# Patient Record
Sex: FEMALE | URBAN - METROPOLITAN AREA
[De-identification: names, ages, dates, MRNs, and addresses within clinical notes are randomized per-mention and may not be internally consistent; named-entity substitution may affect disease eponyms.]

---
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
## 2019-01-07 ENCOUNTER — APPOINTMENT (OUTPATIENT)
Dept: ORTHOPEDIC SURGERY | Facility: CLINIC | Age: 19
End: 2019-01-07
Payer: COMMERCIAL

## 2019-01-07 VITALS — WEIGHT: 109 LBS | HEIGHT: 63 IN | BODY MASS INDEX: 19.31 KG/M2

## 2019-01-07 PROCEDURE — 99024 POSTOP FOLLOW-UP VISIT: CPT

## 2019-01-07 RX ORDER — ONDANSETRON 4 MG/1
4 TABLET ORAL EVERY 8 HOURS
Qty: 4 | Refills: 0 | Status: DISCONTINUED | COMMUNITY
Start: 2018-11-15 | End: 2019-01-07

## 2019-01-10 NOTE — HISTORY OF PRESENT ILLNESS
[de-identified] : DOS: 11-16-18\par 17 year old female accompanied by her boyfriend presenting 7 weeks and 3 days s/p right knee diagnostic arthroscopy and a partial medial menisectomy. She reports some pain with walking, twisting and has stiffness. She states she has a 50% improvement and has been compliant with PT. She reports new injury this weekend when her cousin sat on her knee and it reswelled. No new mechanical symptoms. [de-identified] : General: Patient is awake and alert, demonstrates appropriate mood and affect, exhibits normal breathing and is in no acute distress.\par Constitutional: Well appearing in no apparent distress\par Skin: The skin is intact, warm, pink, and dry over the area examined.\par Lymph: There is no lymphedema except as noted below. \par Cardiovascular: There is brisk capillary refill in the digits of the affected extremity. They are symmetric pulses in the bilateral upper and lower extremities. \par Respiratory: The patient is in no apparent respiratory distress. They're taking full deep breaths without use of accessory muscles or evidence of audible wheezes or stridor without the use of a stethoscope.\par \par Right knee:\par Incisions well healed\par mild effusion\par ROM 0-125\par Stable v/v stress\par no medial or lateral JLT\par stable to lachman\par Motor: EHL/FHL/TA/Gs intact\par Sens: S/S/T/SPN/DPN intact to light touch\par BCR\par  [de-identified] : Assessment: 18-year-old female status post right knee arthroscopy overall doing well she had a reinjury this past Saturday with some swelling.\par \par Patient will continue PT\par I will see the patient back in 6 weeks for reevaluation

## 2019-02-19 ENCOUNTER — APPOINTMENT (OUTPATIENT)
Dept: ORTHOPEDIC SURGERY | Facility: CLINIC | Age: 19
End: 2019-02-19
Payer: COMMERCIAL

## 2019-02-19 DIAGNOSIS — S83.241D OTHER TEAR OF MEDIAL MENISCUS, CURRENT INJURY, RIGHT KNEE, SUBSEQUENT ENCOUNTER: ICD-10-CM

## 2019-02-19 PROCEDURE — 99213 OFFICE O/P EST LOW 20 MIN: CPT

## 2019-08-13 ENCOUNTER — EMERGENCY (EMERGENCY)
Facility: HOSPITAL | Age: 19
LOS: 1 days | Discharge: HOME | End: 2019-08-13
Admitting: EMERGENCY MEDICINE
Payer: COMMERCIAL

## 2019-08-13 VITALS
SYSTOLIC BLOOD PRESSURE: 97 MMHG | DIASTOLIC BLOOD PRESSURE: 62 MMHG | OXYGEN SATURATION: 98 % | HEART RATE: 86 BPM | TEMPERATURE: 98 F | WEIGHT: 115.08 LBS | RESPIRATION RATE: 16 BRPM

## 2019-08-13 PROCEDURE — 99283 EMERGENCY DEPT VISIT LOW MDM: CPT

## 2019-08-13 RX ADMIN — Medication 500 MILLIGRAM(S): at 16:57

## 2019-08-13 NOTE — ED PROVIDER NOTE - CARE PROVIDER_API CALL
Magdy Skinner)  Orthopaedic Surgery  [address] [place] [id number]  Phone: [phone]  Fax: [phone]  Follow Up Time: 1-3 Days

## 2019-08-13 NOTE — ED PROVIDER NOTE - NSFOLLOWUPINSTRUCTIONS_ED_ALL_ED_FT
rest, hydrate well    medication as prescribed    follow up with orthopedics as needed    cold/heat therapy, 4 times a day, 15 min intervals    Strain    A strain is a stretch or tear in one of the muscles in your body. This is caused by an injury to the area such as a twisting mechanism. Symptoms include pain, swelling, or bruising. Rest that area over the next several days and slowly resume activity when tolerated. Ice can help with swelling and pain.     SEEK IMMEDIATE MEDICAL CARE IF YOU HAVE ANY OF THE FOLLOWING SYMPTOMS: worsening pain, inability to move that body part, numbness or tingling.

## 2019-08-13 NOTE — ED PROVIDER NOTE - MUSCULOSKELETAL, MLM
Tenderness to the left posterior medial gluteal wrapping to the thigh. Good pulses. Tenderness to the left posterior lateral gluteal muscle wrapping to the thigh. Good pulses.

## 2019-08-13 NOTE — ED PROVIDER NOTE - OBJECTIVE STATEMENT
19 y/o Female presents to the ED c/o left thigh pain. Pt states she was folk dancing yesterday and heard a "pop" in her groin area. Since then she has had swelling and pain to the left thigh. She has been able to walk but with difficulty secondary to pain.

## 2019-08-14 ENCOUNTER — FORM ENCOUNTER (OUTPATIENT)
Age: 19
End: 2019-08-14

## 2019-08-15 ENCOUNTER — OUTPATIENT (OUTPATIENT)
Dept: OUTPATIENT SERVICES | Facility: HOSPITAL | Age: 19
LOS: 1 days | End: 2019-08-15
Payer: COMMERCIAL

## 2019-08-15 ENCOUNTER — APPOINTMENT (OUTPATIENT)
Dept: ORTHOPEDIC SURGERY | Facility: CLINIC | Age: 19
End: 2019-08-15
Payer: COMMERCIAL

## 2019-08-15 ENCOUNTER — APPOINTMENT (OUTPATIENT)
Dept: RADIOLOGY | Facility: CLINIC | Age: 19
End: 2019-08-15

## 2019-08-15 VITALS — WEIGHT: 109 LBS | BODY MASS INDEX: 19.31 KG/M2 | HEIGHT: 63 IN

## 2019-08-15 DIAGNOSIS — S76.312A STRAIN OF MUSCLE, FASCIA AND TENDON OF THE POSTERIOR MUSCLE GROUP AT THIGH LEVEL, LEFT THIGH, INITIAL ENCOUNTER: ICD-10-CM

## 2019-08-15 PROCEDURE — 99214 OFFICE O/P EST MOD 30 MIN: CPT

## 2019-08-15 PROCEDURE — 72190 X-RAY EXAM OF PELVIS: CPT

## 2019-08-15 PROCEDURE — 72190 X-RAY EXAM OF PELVIS: CPT | Mod: 26

## 2019-08-24 DIAGNOSIS — M79.652 PAIN IN LEFT THIGH: ICD-10-CM
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
## 2019-08-24 DIAGNOSIS — S76.912A STRAIN OF UNSPECIFIED MUSCLES, FASCIA AND TENDONS AT THIGH LEVEL, LEFT THIGH, INITIAL ENCOUNTER: ICD-10-CM

## 2020-01-01 ENCOUNTER — EMERGENCY (EMERGENCY)
Facility: HOSPITAL | Age: 20
LOS: 1 days | Discharge: HOME | End: 2020-01-01
Attending: EMERGENCY MEDICINE | Admitting: EMERGENCY MEDICINE
Payer: COMMERCIAL

## 2020-01-01 ENCOUNTER — RESULT REVIEW (OUTPATIENT)
Age: 20
End: 2020-01-01

## 2020-01-01 ENCOUNTER — INPATIENT (INPATIENT)
Facility: HOSPITAL | Age: 20
LOS: 0 days | Discharge: HOME | End: 2020-01-01
Attending: OBSTETRICS & GYNECOLOGY | Admitting: OBSTETRICS & GYNECOLOGY
Payer: MEDICAID

## 2020-01-01 ENCOUNTER — TRANSCRIPTION ENCOUNTER (OUTPATIENT)
Age: 20
End: 2020-01-01

## 2020-01-01 VITALS
TEMPERATURE: 97 F | RESPIRATION RATE: 18 BRPM | HEART RATE: 95 BPM | SYSTOLIC BLOOD PRESSURE: 102 MMHG | DIASTOLIC BLOOD PRESSURE: 48 MMHG | OXYGEN SATURATION: 100 %

## 2020-01-01 VITALS
OXYGEN SATURATION: 98 % | DIASTOLIC BLOOD PRESSURE: 65 MMHG | HEART RATE: 97 BPM | TEMPERATURE: 98 F | HEIGHT: 63 IN | SYSTOLIC BLOOD PRESSURE: 118 MMHG | WEIGHT: 115.08 LBS | RESPIRATION RATE: 18 BRPM

## 2020-01-01 VITALS
OXYGEN SATURATION: 93 % | TEMPERATURE: 98 F | HEART RATE: 72 BPM | RESPIRATION RATE: 18 BRPM | SYSTOLIC BLOOD PRESSURE: 116 MMHG | DIASTOLIC BLOOD PRESSURE: 77 MMHG

## 2020-01-01 VITALS
DIASTOLIC BLOOD PRESSURE: 44 MMHG | HEART RATE: 62 BPM | OXYGEN SATURATION: 99 % | TEMPERATURE: 98 F | RESPIRATION RATE: 18 BRPM | SYSTOLIC BLOOD PRESSURE: 85 MMHG

## 2020-01-01 LAB
ALBUMIN SERPL ELPH-MCNC: 2.9 G/DL — LOW (ref 3.4–5)
ALP SERPL-CCNC: 83 U/L — SIGNIFICANT CHANGE UP (ref 40–120)
ALT FLD-CCNC: 24 U/L — SIGNIFICANT CHANGE UP (ref 12–42)
ANION GAP SERPL CALC-SCNC: 15 MMOL/L — SIGNIFICANT CHANGE UP (ref 9–16)
APTT BLD: 25.3 SEC — LOW (ref 27.5–36.3)
AST SERPL-CCNC: 27 U/L — SIGNIFICANT CHANGE UP (ref 15–37)
BASOPHILS # BLD AUTO: 0.05 K/UL — SIGNIFICANT CHANGE UP (ref 0–0.2)
BASOPHILS NFR BLD AUTO: 0.3 % — SIGNIFICANT CHANGE UP (ref 0–2)
BILIRUB SERPL-MCNC: 0.7 MG/DL — SIGNIFICANT CHANGE UP (ref 0.2–1.2)
BUN SERPL-MCNC: 4 MG/DL — LOW (ref 7–23)
CALCIUM SERPL-MCNC: 9.2 MG/DL — SIGNIFICANT CHANGE UP (ref 8.5–10.5)
CHLORIDE SERPL-SCNC: 101 MMOL/L — SIGNIFICANT CHANGE UP (ref 96–108)
CO2 SERPL-SCNC: 20 MMOL/L — LOW (ref 22–31)
CREAT SERPL-MCNC: 0.64 MG/DL — SIGNIFICANT CHANGE UP (ref 0.5–1.3)
EOSINOPHIL # BLD AUTO: 0.01 K/UL — SIGNIFICANT CHANGE UP (ref 0–0.5)
EOSINOPHIL NFR BLD AUTO: 0.1 % — SIGNIFICANT CHANGE UP (ref 0–6)
GLUCOSE SERPL-MCNC: 143 MG/DL — HIGH (ref 70–99)
HCG SERPL-ACNC: HIGH MIU/ML
HCT VFR BLD CALC: 31.2 % — LOW (ref 34.5–45)
HGB BLD-MCNC: 10.3 G/DL — LOW (ref 11.5–15.5)
IMM GRANULOCYTES NFR BLD AUTO: 0.8 % — SIGNIFICANT CHANGE UP (ref 0–1.5)
INR BLD: 1.09 — SIGNIFICANT CHANGE UP (ref 0.88–1.16)
LACTATE SERPL-SCNC: 2.2 MMOL/L — HIGH (ref 0.4–2)
LYMPHOCYTES # BLD AUTO: 1.76 K/UL — SIGNIFICANT CHANGE UP (ref 1–3.3)
LYMPHOCYTES # BLD AUTO: 9 % — LOW (ref 13–44)
MAGNESIUM SERPL-MCNC: 1.6 MG/DL — SIGNIFICANT CHANGE UP (ref 1.6–2.6)
MCHC RBC-ENTMCNC: 32.5 PG — SIGNIFICANT CHANGE UP (ref 27–34)
MCHC RBC-ENTMCNC: 33 GM/DL — SIGNIFICANT CHANGE UP (ref 32–36)
MCV RBC AUTO: 98.4 FL — SIGNIFICANT CHANGE UP (ref 80–100)
MONOCYTES # BLD AUTO: 1.38 K/UL — HIGH (ref 0–0.9)
MONOCYTES NFR BLD AUTO: 7 % — SIGNIFICANT CHANGE UP (ref 2–14)
NEUTROPHILS # BLD AUTO: 16.24 K/UL — HIGH (ref 1.8–7.4)
NEUTROPHILS NFR BLD AUTO: 82.8 % — HIGH (ref 43–77)
NRBC # BLD: 0 /100 WBCS — SIGNIFICANT CHANGE UP (ref 0–0)
PLATELET # BLD AUTO: 234 K/UL — SIGNIFICANT CHANGE UP (ref 150–400)
POTASSIUM SERPL-MCNC: 3 MMOL/L — LOW (ref 3.5–5.3)
POTASSIUM SERPL-SCNC: 3 MMOL/L — LOW (ref 3.5–5.3)
PROT SERPL-MCNC: 7.1 G/DL — SIGNIFICANT CHANGE UP (ref 6.4–8.2)
PROTHROM AB SERPL-ACNC: 12 SEC — SIGNIFICANT CHANGE UP (ref 10–12.9)
RBC # BLD: 3.17 M/UL — LOW (ref 3.8–5.2)
RBC # FLD: 12.2 % — SIGNIFICANT CHANGE UP (ref 10.3–14.5)
SODIUM SERPL-SCNC: 136 MMOL/L — SIGNIFICANT CHANGE UP (ref 132–145)
WBC # BLD: 19.59 K/UL — HIGH (ref 3.8–10.5)
WBC # FLD AUTO: 19.59 K/UL — HIGH (ref 3.8–10.5)

## 2020-01-01 PROCEDURE — 99285 EMERGENCY DEPT VISIT HI MDM: CPT

## 2020-01-01 PROCEDURE — 88305 TISSUE EXAM BY PATHOLOGIST: CPT | Mod: 26

## 2020-01-01 PROCEDURE — 71045 X-RAY EXAM CHEST 1 VIEW: CPT | Mod: 26

## 2020-01-01 PROCEDURE — 99053 MED SERV 10PM-8AM 24 HR FAC: CPT

## 2020-01-01 PROCEDURE — 59414 DELIVER PLACENTA: CPT

## 2020-01-01 RX ORDER — ONDANSETRON 8 MG/1
4 TABLET, FILM COATED ORAL ONCE
Refills: 0 | Status: COMPLETED | OUTPATIENT
Start: 2020-01-01 | End: 2020-01-01

## 2020-01-01 RX ORDER — PRAMOXINE HYDROCHLORIDE 150 MG/15G
1 AEROSOL, FOAM RECTAL EVERY 4 HOURS
Refills: 0 | Status: DISCONTINUED | OUTPATIENT
Start: 2020-01-01 | End: 2020-01-01

## 2020-01-01 RX ORDER — SIMETHICONE 80 MG/1
80 TABLET, CHEWABLE ORAL EVERY 4 HOURS
Refills: 0 | Status: DISCONTINUED | OUTPATIENT
Start: 2020-01-01 | End: 2020-01-01

## 2020-01-01 RX ORDER — SODIUM CHLORIDE 9 MG/ML
1000 INJECTION INTRAMUSCULAR; INTRAVENOUS; SUBCUTANEOUS ONCE
Refills: 0 | Status: COMPLETED | OUTPATIENT
Start: 2020-01-01 | End: 2020-01-01

## 2020-01-01 RX ORDER — INFLUENZA VIRUS VACCINE 15; 15; 15; 15 UG/.5ML; UG/.5ML; UG/.5ML; UG/.5ML
0.5 SUSPENSION INTRAMUSCULAR ONCE
Refills: 0 | Status: COMPLETED | OUTPATIENT
Start: 2020-01-01 | End: 2020-01-01

## 2020-01-01 RX ORDER — IBUPROFEN 200 MG
600 TABLET ORAL EVERY 6 HOURS
Refills: 0 | Status: DISCONTINUED | OUTPATIENT
Start: 2020-01-01 | End: 2020-01-01

## 2020-01-01 RX ORDER — HYDROCORTISONE 1 %
1 OINTMENT (GRAM) TOPICAL EVERY 6 HOURS
Refills: 0 | Status: DISCONTINUED | OUTPATIENT
Start: 2020-01-01 | End: 2020-01-01

## 2020-01-01 RX ORDER — PIPERACILLIN AND TAZOBACTAM 4; .5 G/20ML; G/20ML
3.38 INJECTION, POWDER, LYOPHILIZED, FOR SOLUTION INTRAVENOUS ONCE
Refills: 0 | Status: COMPLETED | OUTPATIENT
Start: 2020-01-01 | End: 2020-01-01

## 2020-01-01 RX ORDER — KETOROLAC TROMETHAMINE 30 MG/ML
30 SYRINGE (ML) INJECTION ONCE
Refills: 0 | Status: DISCONTINUED | OUTPATIENT
Start: 2020-01-01 | End: 2020-01-01

## 2020-01-01 RX ORDER — OXYCODONE HYDROCHLORIDE 5 MG/1
5 TABLET ORAL
Refills: 0 | Status: DISCONTINUED | OUTPATIENT
Start: 2020-01-01 | End: 2020-01-01

## 2020-01-01 RX ORDER — DIBUCAINE 1 %
1 OINTMENT (GRAM) RECTAL EVERY 6 HOURS
Refills: 0 | Status: DISCONTINUED | OUTPATIENT
Start: 2020-01-01 | End: 2020-01-01

## 2020-01-01 RX ORDER — LANOLIN
1 OINTMENT (GRAM) TOPICAL EVERY 6 HOURS
Refills: 0 | Status: DISCONTINUED | OUTPATIENT
Start: 2020-01-01 | End: 2020-01-01

## 2020-01-01 RX ORDER — POTASSIUM CHLORIDE 20 MEQ
40 PACKET (EA) ORAL ONCE
Refills: 0 | Status: COMPLETED | OUTPATIENT
Start: 2020-01-01 | End: 2020-01-01

## 2020-01-01 RX ORDER — OXYCODONE HYDROCHLORIDE 5 MG/1
5 TABLET ORAL ONCE
Refills: 0 | Status: DISCONTINUED | OUTPATIENT
Start: 2020-01-01 | End: 2020-01-01

## 2020-01-01 RX ORDER — MAGNESIUM HYDROXIDE 400 MG/1
30 TABLET, CHEWABLE ORAL
Refills: 0 | Status: DISCONTINUED | OUTPATIENT
Start: 2020-01-01 | End: 2020-01-01

## 2020-01-01 RX ORDER — SODIUM CHLORIDE 9 MG/ML
3 INJECTION INTRAMUSCULAR; INTRAVENOUS; SUBCUTANEOUS EVERY 8 HOURS
Refills: 0 | Status: DISCONTINUED | OUTPATIENT
Start: 2020-01-01 | End: 2020-01-01

## 2020-01-01 RX ORDER — ACETAMINOPHEN 500 MG
975 TABLET ORAL
Refills: 0 | Status: DISCONTINUED | OUTPATIENT
Start: 2020-01-01 | End: 2020-01-01

## 2020-01-01 RX ORDER — FENTANYL CITRATE 50 UG/ML
50 INJECTION INTRAVENOUS ONCE
Refills: 0 | Status: DISCONTINUED | OUTPATIENT
Start: 2020-01-01 | End: 2020-01-01

## 2020-01-01 RX ORDER — BENZOCAINE 10 %
1 GEL (GRAM) MUCOUS MEMBRANE EVERY 6 HOURS
Refills: 0 | Status: DISCONTINUED | OUTPATIENT
Start: 2020-01-01 | End: 2020-01-01

## 2020-01-01 RX ORDER — GLYCERIN ADULT
1 SUPPOSITORY, RECTAL RECTAL AT BEDTIME
Refills: 0 | Status: DISCONTINUED | OUTPATIENT
Start: 2020-01-01 | End: 2020-01-01

## 2020-01-01 RX ORDER — IBUPROFEN 200 MG
1 TABLET ORAL
Qty: 0 | Refills: 0 | DISCHARGE
Start: 2020-01-01

## 2020-01-01 RX ORDER — DIPHENHYDRAMINE HCL 50 MG
25 CAPSULE ORAL EVERY 6 HOURS
Refills: 0 | Status: DISCONTINUED | OUTPATIENT
Start: 2020-01-01 | End: 2020-01-01

## 2020-01-01 RX ORDER — HYDROMORPHONE HYDROCHLORIDE 2 MG/ML
1 INJECTION INTRAMUSCULAR; INTRAVENOUS; SUBCUTANEOUS ONCE
Refills: 0 | Status: DISCONTINUED | OUTPATIENT
Start: 2020-01-01 | End: 2020-01-01

## 2020-01-01 RX ORDER — AER TRAVELER 0.5 G/1
1 SOLUTION RECTAL; TOPICAL EVERY 4 HOURS
Refills: 0 | Status: DISCONTINUED | OUTPATIENT
Start: 2020-01-01 | End: 2020-01-01

## 2020-01-01 RX ORDER — ACETAMINOPHEN 500 MG
3 TABLET ORAL
Qty: 0 | Refills: 0 | DISCHARGE
Start: 2020-01-01

## 2020-01-01 RX ORDER — OXYTOCIN 10 UNIT/ML
333.33 VIAL (ML) INJECTION
Qty: 20 | Refills: 0 | Status: DISCONTINUED | OUTPATIENT
Start: 2020-01-01 | End: 2020-01-01

## 2020-01-01 RX ORDER — TETANUS TOXOID, REDUCED DIPHTHERIA TOXOID AND ACELLULAR PERTUSSIS VACCINE, ADSORBED 5; 2.5; 8; 8; 2.5 [IU]/.5ML; [IU]/.5ML; UG/.5ML; UG/.5ML; UG/.5ML
0.5 SUSPENSION INTRAMUSCULAR ONCE
Refills: 0 | Status: DISCONTINUED | OUTPATIENT
Start: 2020-01-01 | End: 2020-01-01

## 2020-01-01 RX ORDER — IBUPROFEN 200 MG
600 TABLET ORAL EVERY 6 HOURS
Refills: 0 | Status: COMPLETED | OUTPATIENT
Start: 2020-01-01 | End: 2020-11-29

## 2020-01-01 RX ADMIN — PIPERACILLIN AND TAZOBACTAM 200 GRAM(S): 4; .5 INJECTION, POWDER, LYOPHILIZED, FOR SOLUTION INTRAVENOUS at 03:47

## 2020-01-01 RX ADMIN — FENTANYL CITRATE 50 MICROGRAM(S): 50 INJECTION INTRAVENOUS at 04:04

## 2020-01-01 RX ADMIN — HYDROMORPHONE HYDROCHLORIDE 1 MILLIGRAM(S): 2 INJECTION INTRAMUSCULAR; INTRAVENOUS; SUBCUTANEOUS at 03:33

## 2020-01-01 RX ADMIN — ONDANSETRON 4 MILLIGRAM(S): 8 TABLET, FILM COATED ORAL at 04:05

## 2020-01-01 RX ADMIN — Medication 975 MILLIGRAM(S): at 15:45

## 2020-01-01 RX ADMIN — HYDROMORPHONE HYDROCHLORIDE 1 MILLIGRAM(S): 2 INJECTION INTRAMUSCULAR; INTRAVENOUS; SUBCUTANEOUS at 04:59

## 2020-01-01 RX ADMIN — Medication 30 MILLIGRAM(S): at 13:21

## 2020-01-01 RX ADMIN — HYDROMORPHONE HYDROCHLORIDE 1 MILLIGRAM(S): 2 INJECTION INTRAMUSCULAR; INTRAVENOUS; SUBCUTANEOUS at 03:25

## 2020-01-01 RX ADMIN — Medication 975 MILLIGRAM(S): at 15:00

## 2020-01-01 RX ADMIN — Medication 600 MILLIGRAM(S): at 12:00

## 2020-01-01 RX ADMIN — INFLUENZA VIRUS VACCINE 0.5 MILLILITER(S): 15; 15; 15; 15 SUSPENSION INTRAMUSCULAR at 15:29

## 2020-01-01 RX ADMIN — SODIUM CHLORIDE 2000 MILLILITER(S): 9 INJECTION INTRAMUSCULAR; INTRAVENOUS; SUBCUTANEOUS at 03:33

## 2020-01-01 RX ADMIN — Medication 600 MILLIGRAM(S): at 16:50

## 2020-01-01 RX ADMIN — Medication 30 MILLIGRAM(S): at 03:33

## 2020-01-01 RX ADMIN — Medication 40 MILLIEQUIVALENT(S): at 04:05

## 2020-01-01 RX ADMIN — Medication 600 MILLIGRAM(S): at 11:27

## 2020-01-01 RX ADMIN — Medication 30 MILLIGRAM(S): at 03:32

## 2020-01-01 RX ADMIN — Medication 600 MILLIGRAM(S): at 17:07

## 2020-01-01 RX ADMIN — Medication 30 MILLIGRAM(S): at 07:56

## 2020-01-01 RX ADMIN — Medication 1000 MILLIUNIT(S)/MIN: at 08:06

## 2020-01-01 RX ADMIN — SODIUM CHLORIDE 1000 MILLILITER(S): 9 INJECTION INTRAMUSCULAR; INTRAVENOUS; SUBCUTANEOUS at 03:33

## 2020-01-01 NOTE — ED PROVIDER NOTE - CLINICAL SUMMARY MEDICAL DECISION MAKING FREE TEXT BOX
pt with reported spontaneous miscarriage in 09/2019, never followed up with OB for outpt US subsequently, noted irregularly menses, and now with abrupt onset of abdominal pain and brisk vaginal bleeding. abd exam with palpable mass in the RLQ region, +suprapubic tenderness and fullness, TAUS performed at bedside with complex heterogeneous mass noted in the suprapubic/RLQ region with HR in 160s, no IUP noted, beta elevated >45K, H/H and VSS otherwise, CXR with no free air noted, no free fluid on bedside US, sx suspicious for ectopic pregnancy, OB attending paged multiple times with no call back, case discussed with [facility] for STAT transfer, accepted by Dr. Zayas pt with reported spontaneous miscarriage in 09/2019, never followed up with OB for outpt US subsequently, noted irregularly menses, and now with abrupt onset of abdominal pain and brisk vaginal bleeding. abd exam with palpable mass in the RLQ region, +suprapubic tenderness and fullness, TAUS performed at bedside with complex heterogeneous mass noted in the suprapubic/RLQ region with HR in 160s, no IUP noted, beta elevated >45K, H/H and VSS otherwise, CXR with no free air noted, no free fluid on bedside US, sx suspicious for ectopic pregnancy, OB attending paged multiple times with no call back, case discussed with [facility] for STAT transfer for further evaluation, accepted by Dr. Zayas

## 2020-01-01 NOTE — DISCHARGE NOTE OB - CARE PROVIDER_API CALL
Harris Garcia)  Obstetrics and Gynecology  [address], [facility] [place] [address]  Phone: [phone]  Fax: [phone]  Follow Up Time:

## 2020-01-01 NOTE — DISCHARGE NOTE OB - HOSPITAL COURSE
20 yo  at [id number] by uncertain LMP was transferred from [facility] for evaluation of severe abdominal pain and vaginal bleeding. In the [facility] ED, the patient was found to be delivering a fetus in the breech position. She delivered a nonviable fetus in the ED and was then brought up to L&D for delivery of the placenta. She had an otherwise uncomplicated postpartum course and has met her postpartum milestones appropriately.  Vitals are stable for discharge. She was evaluated by Social Work prior to discharge. 18 yo  at [id number] by uncertain LMP was transferred from [facility] for evaluation of severe abdominal pain and vaginal bleeding. In the [facility] ED, the patient was found to be delivering a fetus in the breech position. She delivered a nonviable fetus in the ED and was then brought up to L&D for delivery of the placenta. She had an otherwise uncomplicated postpartum course and has met her postpartum milestones appropriately. She is Rh positive and thus was not administered Rhogam. Vitals are stable for discharge. She was evaluated by Social Work prior to discharge.

## 2020-01-01 NOTE — ED PROVIDER NOTE - ATTENDING CONTRIBUTION TO CARE
19yof pw vaginal bleeding and abd pain.  pt unsure when LPM, but about 7/2019.  pt states in Sept 2019, pt was pregnant and had vb and was dx w/ spontaneous miscarriage (did not received MTX or D&C/E).  pt states noted intermittent spotting/cramping x 2 days, but heavier tonight.  pt did not feel any abd distension/enlargement.      agree w/ PA, pt w/ palpable mass to rlq, +guarding on exam, bedside transabd US noted a mass w/ what appears to be a heart beat w/ rate of 160, no significant free fluid noted on fast.  labs pending    attempted to page to GYN, no callback.  [facility] ED contacted for lights/siren transfer to [facility] for US and GYN consultation

## 2020-01-01 NOTE — DISCHARGE NOTE OB - PLAN OF CARE
Healthy recovery - Take Motrin 600mg every 6 hours and/or Tylenol 650mg every 6 hours as needed for pain.   - Call [phone] to schedule a follow up appointment with Dr. Iglesias Or for 6 weeks after delivery.   - Call your Doctor if you experience severe abdominal pain not improved by oral pain medications, heavy bright red vaginal bleeding saturating more than 1 pad per hour, or fever greater than 100.4F.   - Nothing in the vagina for 6 weeks ( no intercourse or tampons.)  - Shower only for 6 weeks - no baths or swimming.

## 2020-01-01 NOTE — ED PROVIDER NOTE - CARE PLAN
Principal Discharge DX:	Pregnancy complication  Secondary Diagnosis:	Vaginal bleeding in pregnancy  Secondary Diagnosis:	Hypokalemia

## 2020-01-01 NOTE — DISCHARGE NOTE OB - MEDICATION SUMMARY - MEDICATIONS TO TAKE
I will START or STAY ON the medications listed below when I get home from the hospital:    acetaminophen 325 mg oral tablet  -- 3 tab(s) by mouth   -- Indication: For Postpartum state    ibuprofen 600 mg oral tablet  -- 1 tab(s) by mouth every 6 hours  -- Indication: For Postpartum state

## 2020-01-01 NOTE — ED ADULT NURSE NOTE - OBJECTIVE STATEMENT
18 y/o F c/o intense suprapubic cramping that began late this afternoon. Pt also c/o vaginal bleeding with clots. Suprapubic region appears distended and is tender. Unknown LMP. Pt denies dysuria. V/D, fever, CP, SOB. Pt states she had a miscarriage in september. Pt denies additional PMH.

## 2020-01-01 NOTE — ED PROVIDER NOTE - PROGRESS NOTE DETAILS
pt seen immediately upon arrival, bedside US reveals no IUP, +FHR with complex heterogeneous mass in the ?R adnexal region, ?ectopic, awaiting beta HCG, will likely need STAT transfer to [facility] for OB/GYN Eval and official US able to get in touch with OB/GYN attending - Dr. Garcia, made aware of pt's condition and accepted transfer to [facility] ER for further evaluation pt seen immediately upon arrival, bedside US reveals no IUP, +FHR with complex heterogeneous mass in the ?R adnexal/suprapubic region, ?ectopic, awaiting beta HCG, repeat bedside US performed by Dr. Blanco at bedside as well, FHR 160s, unable to confirm IUP vs ectopic, will likely need STAT transfer to [facility] for OB/GYN Eval and official US

## 2020-01-01 NOTE — PROGRESS NOTE ADULT - ASSESSMENT
18 yo  s/p periviable  delivery at [id number] by uncertain LMP - PPD0.   1. Pain: Oral pain medications as needed; topical agents for perineal discomfort  2. GI: Regular diet  3. : voiding spontaneously  4. DVT prophylaxis: Ambulate as tolerated  5. Continue routine postpartum care  6. Dispo: this afternoon once patient is ready

## 2020-01-01 NOTE — ED PROVIDER NOTE - PHYSICAL EXAMINATION
Vital Signs - nursing notes reviewed and confirmed  Gen - Petite F, uncomfortable writhing in pain, non-toxic appearing, speaking in full sentences   Skin - warm, dry, intact  HEENT - AT/NC, PERRL, EOMI, no conjunctival injection, dry oral mucosa, TM intact b/l with good cone of lights, o/p clear with no erythema, edema, or exudate, uvula midline, airway patent, neck supple and NT  CV - S1S2, rapid rate, regular rhythm   Resp - respiration non-labored, CTAB, symmetric bs b/l, no r/r/w  GI - NABS, soft, palpable firm mass to the RLQ with TTP, +guarding and rebound, no CVAT b/l   MS - w/w/p, no c/c/e, calves supple and NT, distal pulses symmetric b/l, brisk cap refills, +SILT  Neuro - AxOx3, no focal neuro deficits, ambulatory without gait disturbance Vital Signs - nursing notes reviewed and confirmed  Gen - Petite F, uncomfortable writhing in pain, non-toxic appearing, speaking in full sentences   Skin - warm, dry, intact  HEENT - AT/NC, PERRL, EOMI, no conjunctival injection, dry oral mucosa, TM intact b/l with good cone of lights, o/p clear with no erythema, edema, or exudate, uvula midline, airway patent, neck supple and NT  CV - S1S2, rapid rate, regular rhythm   Resp - respiration non-labored, CTAB, symmetric bs b/l, no r/r/w  GI - NABS, soft, palpable firm mass to the RLQ with TTP, +guarding and rebound, no CVAT b/l   pelvic exam - external genitalia wnl, no rash, vesicles, erythema, edema, or laceration, +bleeding in vault with clots, mild CMT and R adnexal tenderness, unable to visualize cervical os  MS - w/w/p, no c/c/e, calves supple and NT, distal pulses symmetric b/l, brisk cap refills, +SILT  Neuro - AxOx3, no focal neuro deficits, ambulatory without gait disturbance

## 2020-01-01 NOTE — DISCHARGE NOTE OB - PATIENT PORTAL LINK FT
You can access the FollowMyHealth Patient Portal offered by [facility] by registering at the following website: [email or web address]. By joining [facility]’s FollowMyHealth portal, you will also be able to view your health information using other applications (apps) compatible with our system.

## 2020-01-01 NOTE — DISCHARGE NOTE OB - CARE PLAN
Principal Discharge DX:	 labor in second trimester with  delivery in second trimester, single or unspecified fetus  Goal:	Healthy recovery  Assessment and plan of treatment:	- Take Motrin 600mg every 6 hours and/or Tylenol 650mg every 6 hours as needed for pain.   - Call [phone] to schedule a follow up appointment with Dr. Iglesias Or for 6 weeks after delivery.   - Call your Doctor if you experience severe abdominal pain not improved by oral pain medications, heavy bright red vaginal bleeding saturating more than 1 pad per hour, or fever greater than 100.4F.   - Nothing in the vagina for 6 weeks ( no intercourse or tampons.)  - Shower only for 6 weeks - no baths or swimming.

## 2020-01-01 NOTE — ED ADULT TRIAGE NOTE - CHIEF COMPLAINT QUOTE
walk in pt with complaints of intense cramping suprapubic pain that began late this afternoon. Just began bleeding vaginally and passing clots. Unsure how many pads. Unknown LMP.

## 2020-01-01 NOTE — ED ADULT NURSE NOTE - NSIMPLEMENTINTERV_GEN_ALL_ED
Implemented All Universal Safety Interventions:  [place] to call system. Call bell, personal items and telephone within reach. Instruct patient to call for assistance. Room bathroom lighting operational. Non-slip footwear when patient is off stretcher. Physically safe environment: no spills, clutter or unnecessary equipment. Stretcher in lowest position, wheels locked, appropriate side rails in place.

## 2020-01-01 NOTE — PROGRESS NOTE ADULT - SUBJECTIVE AND OBJECTIVE BOX
Patient evaluated at bedside.    She reports pain is well controlled with tylenol and motrin.   She denies heavy vaginal bleeding or perineal discomfort.  She has been ambulating without assistance, voiding spontaneously and tolerating a regular diet. She would like to go home to [facility]. She has her family that will be her support system.     Physical Exam:  T(C): 36.4 (01-01-20 @ 09:00), Max: 36.5 (01-01-20 @ 04:56)  HR: 62 (01-01-20 @ 09:00) (62 - 110)  BP: 85/44 (01-01-20 @ 09:00) (85/44 - 123/77)  RR: 18 (01-01-20 @ 09:00) (16 - 18)  SpO2: 99% (01-01-20 @ 09:00) (93% - 100%)  Wt(kg): --    GA: NAD, resting comfortably   Abd: soft, nontender, nondistended, no rebound or guarding,   : lochia WNL                               9.4    19.46 )-----------( 209      ( 01 Jan 2020 05:44 )             29.9     01-01    136  |  101  |  4<L>  ----------------------------<  143<H>  3.0<L>   |  20<L>  |  0.64    Ca    9.2      01 Jan 2020 03:21  Mg     1.6     01-01    TPro  7.1  /  Alb  2.9<L>  /  TBili  0.7  /  DBili  x   /  AST  27  /  ALT  24  /  AlkPhos  83  01-01

## 2020-01-01 NOTE — ED PROVIDER NOTE - OBJECTIVE STATEMENT
20 yo F , LMP 2019, reported spontaneous miscarriage in 2019, no other medical problems, presenting c/o abdominal pain and vaginal bleeding 20 yo F , LMP 2019, reported spontaneous miscarriage in 2019, no other medical problems, presenting c/o sudden onset of abdominal pain and heavy vaginal bleeding x 1 hr PTA to the ED.  Pt reports having irregular menses, last regular menstrual cycle was back in 2019.  Noted mild vaginal spotting and intermittent abdominal cramps in the past 2 days, but pain got worse this morning with heavy vaginal bleeding (unable to quantify amount).  Pain is sharp, constant, rated 10/10, radiates from suprapubic region to the lower abdomen with associated chills and lightheadedness.  Denies fever, N/V/D/C, melena, hematochezia, hematuria, change in urinary/bowel function, dysuria, foul smelling vagina d/c, flank pain, HA, LOC, focal weakness, CP, SOB, palpitations, cough, and malaise. 20 yo F , LMP 2019, reported spontaneous miscarriage in 2019, no other medical problems, presenting c/o sudden onset of abdominal pain and heavy vaginal bleeding x 1 hr PTA to the ED.  Pt reports having irregular menses, last regular menstrual cycle was back in 2019.  Noted mild vaginal spotting and intermittent abdominal cramps in the past 2 days, but pain got worse this morning with heavy vaginal bleeding (unable to quantify amount).  Pain is sharp, constant, rated 10/10, radiates from suprapubic region to the lower abdomen with associated chills and lightheadedness.  Denies fever, N/V/D/C, melena, hematochezia, hematuria, change in urinary/bowel function, dysuria, foul smelling vagina d/c, flank pain, HA, LOC, focal weakness, CP, SOB, palpitations, cough, and malaise. Admits to sexual intercourse with her usual male partner earlier yesterday prior to onset of sx.

## 2020-01-06 DIAGNOSIS — Z37.1 SINGLE STILLBIRTH: ICD-10-CM

## 2020-01-06 DIAGNOSIS — Z3A.23 23 WEEKS GESTATION OF PREGNANCY: ICD-10-CM

## 2020-01-06 LAB
CULTURE RESULTS: SIGNIFICANT CHANGE UP
CULTURE RESULTS: SIGNIFICANT CHANGE UP
SPECIMEN SOURCE: SIGNIFICANT CHANGE UP
SPECIMEN SOURCE: SIGNIFICANT CHANGE UP

## 2020-01-07 LAB — SURGICAL PATHOLOGY STUDY: SIGNIFICANT CHANGE UP

## 2020-01-08 DIAGNOSIS — R10.31 RIGHT LOWER QUADRANT PAIN: ICD-10-CM

## 2020-01-08 DIAGNOSIS — R42 DIZZINESS AND GIDDINESS: ICD-10-CM

## 2020-01-08 DIAGNOSIS — N93.9 ABNORMAL UTERINE AND VAGINAL BLEEDING, UNSPECIFIED: ICD-10-CM

## 2020-01-08 DIAGNOSIS — Z3A.00 WEEKS OF GESTATION OF PREGNANCY NOT SPECIFIED: ICD-10-CM

## 2020-01-08 DIAGNOSIS — O20.9 HEMORRHAGE IN EARLY PREGNANCY, UNSPECIFIED: ICD-10-CM

## 2020-02-05 ENCOUNTER — APPOINTMENT (OUTPATIENT)
Dept: OBGYN | Facility: CLINIC | Age: 20
End: 2020-02-05
Payer: COMMERCIAL

## 2020-02-05 VITALS
HEIGHT: 63 IN | WEIGHT: 114.88 LBS | SYSTOLIC BLOOD PRESSURE: 100 MMHG | BODY MASS INDEX: 20.36 KG/M2 | DIASTOLIC BLOOD PRESSURE: 70 MMHG

## 2020-02-05 PROCEDURE — 99202 OFFICE O/P NEW SF 15 MIN: CPT

## 2020-02-05 NOTE — HISTORY OF PRESENT ILLNESS
[Definite:  ___ (Date)] : the last menstrual period was [unfilled] [Normal Amount/Duration] : was of a normal amount and duration [Frequency: Q ___ days] : menstrual periods occur approximately every [unfilled] days [Menstrual Cramps] : menstrual cramps [Spotting Between  Menses] : no spotting between menses [Regular Cycle Intervals] : periods have been irregular [On BCP at conception] : the patient was not on BCP at conception [Contraception] : does not use contraception

## 2020-03-04 ENCOUNTER — APPOINTMENT (OUTPATIENT)
Dept: OBGYN | Facility: CLINIC | Age: 20
End: 2020-03-04

## 2020-03-18 ENCOUNTER — LABORATORY RESULT (OUTPATIENT)
Age: 20
End: 2020-03-18

## 2020-03-18 ENCOUNTER — APPOINTMENT (OUTPATIENT)
Dept: HEMATOLOGY ONCOLOGY | Facility: CLINIC | Age: 20
End: 2020-03-18
Payer: COMMERCIAL

## 2020-03-18 VITALS
WEIGHT: 112 LBS | BODY MASS INDEX: 19.84 KG/M2 | TEMPERATURE: 98.2 F | HEART RATE: 57 BPM | RESPIRATION RATE: 15 BRPM | SYSTOLIC BLOOD PRESSURE: 107 MMHG | DIASTOLIC BLOOD PRESSURE: 64 MMHG | OXYGEN SATURATION: 100 % | HEIGHT: 63 IN

## 2020-03-18 DIAGNOSIS — O09.292 SUPERVISION OF PREGNANCY WITH OTHER POOR REPRODUCTIVE OR OBSTETRIC HISTORY, SECOND TRIMESTER: ICD-10-CM

## 2020-03-18 DIAGNOSIS — Z80.0 FAMILY HISTORY OF MALIGNANT NEOPLASM OF DIGESTIVE ORGANS: ICD-10-CM

## 2020-03-18 DIAGNOSIS — Z80.3 FAMILY HISTORY OF MALIGNANT NEOPLASM OF BREAST: ICD-10-CM

## 2020-03-18 DIAGNOSIS — Z86.2 PERSONAL HISTORY OF DISEASES OF THE BLOOD AND BLOOD-FORMING ORGANS AND CERTAIN DISORDERS INVOLVING THE IMMUNE MECHANISM: ICD-10-CM

## 2020-03-18 PROCEDURE — 99204 OFFICE O/P NEW MOD 45 MIN: CPT | Mod: 25

## 2020-03-18 PROCEDURE — 36415 COLL VENOUS BLD VENIPUNCTURE: CPT

## 2020-03-18 RX ORDER — MELOXICAM 15 MG/1
15 TABLET ORAL
Qty: 90 | Refills: 0 | Status: COMPLETED | COMMUNITY
Start: 2019-08-15 | End: 2020-03-18

## 2020-03-20 LAB
25(OH)D3 SERPL-MCNC: 15.5 NG/ML
ALBUMIN SERPL ELPH-MCNC: 5 G/DL
ALP BLD-CCNC: 58 U/L
ALT SERPL-CCNC: 8 U/L
ANA SER IF-ACNC: NEGATIVE
ANION GAP SERPL CALC-SCNC: 14 MMOL/L
AST SERPL-CCNC: 23 U/L
AT III PPP CHRO-ACNC: 102 %
B2 GLYCOPROT1 IGA SERPL IA-ACNC: <5 SAU
B2 GLYCOPROT1 IGG SER-ACNC: <5 SGU
B2 GLYCOPROT1 IGM SER-ACNC: 7.3 SMU
BASOPHILS # BLD AUTO: 0.01 K/UL
BASOPHILS NFR BLD AUTO: 0.3 %
BILIRUB SERPL-MCNC: 0.7 MG/DL
BUN SERPL-MCNC: 8 MG/DL
CALCIUM SERPL-MCNC: 9.9 MG/DL
CARDIOLIPIN AB SER IA-ACNC: POSITIVE
CHLORIDE SERPL-SCNC: 103 MMOL/L
CO2 SERPL-SCNC: 23 MMOL/L
CONFIRM: 29.8 SEC
CREAT SERPL-MCNC: 0.79 MG/DL
DRVVT IMM 1:2 NP PPP: NORMAL
DRVVT SCREEN TO CONFIRM RATIO: 0.94 RATIO
EOSINOPHIL # BLD AUTO: 0.02 K/UL
EOSINOPHIL NFR BLD AUTO: 0.6 %
ERYTHROCYTE [SEDIMENTATION RATE] IN BLOOD BY WESTERGREN METHOD: 9 MM/HR
FACT VIII ACT/NOR PPP: 127 %
FERRITIN SERPL-MCNC: 28 NG/ML
GLUCOSE SERPL-MCNC: 92 MG/DL
HCT VFR BLD CALC: 35.3 %
HCYS SERPL-MCNC: 8.9 UMOL/L
HGB BLD-MCNC: 10.8 G/DL
IMM GRANULOCYTES NFR BLD AUTO: 0 %
IRON SATN MFR SERPL: 30 %
IRON SERPL-MCNC: 130 UG/DL
LDH SERPL-CCNC: 190 U/L
LYMPHOCYTES # BLD AUTO: 1.6 K/UL
LYMPHOCYTES NFR BLD AUTO: 51.6 %
MAN DIFF?: NORMAL
MCHC RBC-ENTMCNC: 29.3 PG
MCHC RBC-ENTMCNC: 30.6 GM/DL
MCV RBC AUTO: 95.9 FL
MONOCYTES # BLD AUTO: 0.26 K/UL
MONOCYTES NFR BLD AUTO: 8.4 %
NEUTROPHILS # BLD AUTO: 1.21 K/UL
NEUTROPHILS NFR BLD AUTO: 39.1 %
PLATELET # BLD AUTO: 270 K/UL
POTASSIUM SERPL-SCNC: 4 MMOL/L
PROT C PPP CHRO-ACNC: 101 %
PROT S AG ACT/NOR PPP IA: 130 %
PROT SERPL-MCNC: 7.2 G/DL
RBC # BLD: 3.68 M/UL
RBC # FLD: 11.7 %
SCREEN DRVVT: 31 SEC
SILICA CLOTTING TIME INTERPRETATION: NORMAL
SILICA CLOTTING TIME S/C: 0.86 RATIO
SODIUM SERPL-SCNC: 140 MMOL/L
TIBC SERPL-MCNC: 435 UG/DL
TSH SERPL-ACNC: 2.32 UIU/ML
UIBC SERPL-MCNC: 306 UG/DL
VIT B12 SERPL-MCNC: 880 PG/ML
WBC # FLD AUTO: 3.1 K/UL

## 2020-03-20 NOTE — HISTORY OF PRESENT ILLNESS
[de-identified] : 19 years old -American female no significant past medical history had a spontaneous  on ... She is here for hypercoagulable work-up prior to starting anticoagulation.\par \par No real family history of clotting at young age.

## 2020-03-20 NOTE — CONSULT LETTER
[Dear  ___] : Dear  [unfilled], [Consult Letter:] : I had the pleasure of evaluating your patient, [unfilled]. [Please see my note below.] : Please see my note below. [Consult Closing:] : Thank you very much for allowing me to participate in the care of this patient.  If you have any questions, please do not hesitate to contact me. [Sincerely,] : Sincerely, [FreeTextEntry3] : Sugar Weston MD\par

## 2020-03-20 NOTE — ASSESSMENT
[FreeTextEntry1] : Discussed with patient the indication for hypercoagulable work-up. \par \par We will do hypercoagulable work-up prior to starting birth control pills and notify Dr. Garcia of results.\par \par Pt was found to have anticardiolipin abs both IgG and IgM and I therefor recommend pt stay away from birth control pills and uses an alternative mode of contraception.\par \par Thanks for this kind referral.\par \par

## 2020-03-25 ENCOUNTER — APPOINTMENT (OUTPATIENT)
Dept: HEMATOLOGY ONCOLOGY | Facility: CLINIC | Age: 20
End: 2020-03-25
Payer: COMMERCIAL

## 2020-03-25 DIAGNOSIS — R76.0 RAISED ANTIBODY TITER: ICD-10-CM

## 2020-03-25 LAB
DNA PLOIDY SPEC FC-IMP: NORMAL
PTR INTERP: NORMAL

## 2020-03-25 PROCEDURE — 99442: CPT

## 2020-03-27 PROBLEM — R76.0 ANTICARDIOLIPIN ANTIBODY POSITIVE: Status: ACTIVE | Noted: 2020-03-20

## 2020-08-24 ENCOUNTER — TRANSCRIPTION ENCOUNTER (OUTPATIENT)
Age: 20
End: 2020-08-24

## 2021-03-15 NOTE — ED ADULT NURSE NOTE - RN DISCHARGE SIGNATURE
3/15/2021         RE: Jayashree Johnson  [address]  [facility] [address]        Dear Colleague,    Thank you for referring your patient, Jayashree Johnson, to the [facility] [place]. Please see a copy of my visit note below.    .Melissa is a 51 year old who is being evaluated via a billable telephone visit.      What phone number would you like to be contacted at? [phone]  How would you like to obtain your AVS? MyChart    Left shoulder pain    Subjective   Melissa is a 51 year old who presents for the following health issues left shoulder pain    HPI     51-year-old female with a history of C5-6 ACDF done in 2018.  She now presents with worsening pain in her left shoulder as well as some numbness and tingling down her left arm into the fingertips.  She has been working with Dr. Eason, as well as with physical therapy.  She had also seen orthopedics, and was told that she has a small rotator cuff tear, as well as adhesive capsulitis in her left shoulder.  She received an injection into her left shoulder, but did not really notice any symptomatic improvement.  She does have a new cervical spine MRI.  There are a couple areas of some disc bulging, but no overt nerve impingement.    Review of Systems   CONSTITUTIONAL: NEGATIVE for fever, chills, change in weight  ENT/MOUTH: NEGATIVE for ear, mouth and throat problems  RESP: NEGATIVE for significant cough or SOB  CV: NEGATIVE for chest pain, palpitations or peripheral edema      Objective           Vitals:  No vitals were obtained today due to virtual visit.    Physical Exam   healthy, alert and no distress  PSYCH: Alert and oriented times 3; coherent speech, normal   rate and volume, able to articulate logical thoughts, able   to abstract reason, no tangential thoughts, no hallucinations   or delusions  Her affect is normal and pleasant  RESP: No cough, no audible wheezing, able to talk in full sentences  Remainder of exam unable to be  completed due to telephone visits      Assessment/Plan:  I did have a discussion with her regarding her physical symptoms and MRI findings.  She understand that there are no obvious areas of nerve impingement in her cervical spine.  However, as she has had an injection into her left shoulder with no symptomatic improvement, it does make sense to try an injection into her cervical epidural space.  She did wish to proceed with that option.  We will also send over prescription for some hydrocodone to her pharmacy.  She will follow-up with us after the injection to let us know how she is doing.        Phone call duration: 10 minutes        Again, thank you for allowing me to participate in the care of your patient.        Sincerely,        William Albert PA-C     01-Nov-2018

## 2021-04-08 NOTE — ED ADULT TRIAGE NOTE - IDEAL BODY WEIGHT(KG)
CERTIFICATE OF RETURN TO WORK      April 8, 2021      Re: Jim Long  [address] [facility] [address]        This is to certify that Jim Long has been under my care from 4/8/2021 and is unable to return to work until further notice.                SIGNATURE:___________________________________________,   4/8/2021                                                           Dr. Carla Le  Orthopedics  [address] [place] [address]                                                     
51

## 2021-11-06 NOTE — ED ADULT TRIAGE NOTE - CCCP TRG CHIEF CMPLNT
knee pain/injury
Airway patent, Nasal mucosa clear. Mouth with normal mucosa. Throat has no vesicles, no oropharyngeal exudates and uvula is midline.

## 2024-04-04 NOTE — PATIENT PROFILE OB - BILL OF RIGHTS/ADMISSION INFORMATION PROVIDED TO:
Hpi Title: Evaluation of Skin Lesions How Severe Are Your Spot(S)?: mild Have Your Spot(S) Been Treated In The Past?: has not been treated Patient